# Patient Record
Sex: FEMALE | Race: WHITE | NOT HISPANIC OR LATINO | Employment: FULL TIME | URBAN - METROPOLITAN AREA
[De-identification: names, ages, dates, MRNs, and addresses within clinical notes are randomized per-mention and may not be internally consistent; named-entity substitution may affect disease eponyms.]

---

## 2023-02-20 LAB
ESTRADIOL (PG/ML) IN SER/PLAS: 219 PG/ML
FOLLITROPIN (IU/L) IN SER/PLAS: 14.1 IU/L
LUTEINIZING HORMONE (IU/ML) IN SER/PLAS: 12.6 IU/L

## 2024-02-21 ENCOUNTER — TELEPHONE (OUTPATIENT)
Dept: GYNECOLOGIC ONCOLOGY | Facility: HOSPITAL | Age: 59
End: 2024-02-21
Payer: COMMERCIAL

## 2024-02-21 DIAGNOSIS — Z12.31 SCREENING MAMMOGRAM, ENCOUNTER FOR: Primary | ICD-10-CM

## 2024-02-29 ENCOUNTER — OFFICE VISIT (OUTPATIENT)
Dept: GYNECOLOGIC ONCOLOGY | Facility: CLINIC | Age: 59
End: 2024-02-29
Payer: COMMERCIAL

## 2024-02-29 VITALS
DIASTOLIC BLOOD PRESSURE: 80 MMHG | RESPIRATION RATE: 18 BRPM | WEIGHT: 199.74 LBS | TEMPERATURE: 97.9 F | HEART RATE: 70 BPM | OXYGEN SATURATION: 96 % | SYSTOLIC BLOOD PRESSURE: 126 MMHG

## 2024-02-29 DIAGNOSIS — C53.9 MALIGNANT NEOPLASM OF CERVIX, UNSPECIFIED SITE (MULTI): Primary | ICD-10-CM

## 2024-02-29 PROCEDURE — 88175 CYTOPATH C/V AUTO FLUID REDO: CPT | Mod: TC,GCY | Performed by: NURSE PRACTITIONER

## 2024-02-29 PROCEDURE — 99214 OFFICE O/P EST MOD 30 MIN: CPT | Performed by: NURSE PRACTITIONER

## 2024-02-29 PROCEDURE — 1036F TOBACCO NON-USER: CPT | Performed by: NURSE PRACTITIONER

## 2024-02-29 PROCEDURE — 87624 HPV HI-RISK TYP POOLED RSLT: CPT | Performed by: NURSE PRACTITIONER

## 2024-02-29 PROCEDURE — 88141 CYTOPATH C/V INTERPRET: CPT | Performed by: PATHOLOGY

## 2024-02-29 RX ORDER — LEVOTHYROXINE SODIUM 100 UG/1
100 TABLET ORAL
COMMUNITY

## 2024-02-29 ASSESSMENT — PAIN SCALES - GENERAL: PAINLEVEL: 0-NO PAIN

## 2024-02-29 NOTE — PROGRESS NOTES
Patient ID: Cassy Casanova is a 58 y.o. female.  Referring Physician: No referring provider defined for this encounter.  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  Initial treatment was with with conization of the cervix, and later underwent total  vaginal hysterectomy in 2008.     No evidence of recurrent dysplasia or cancer on subsequent examinations and cytology assessments.     Diagnosed with papillary thyroid cancer in February 2016.  1 regional lymph node involvement with cancer.      1/8/16- left thyroid with isthmusectomy     4/8/16- right completion thyroidectomy with ipsilateral central neck lymph node dissection and right inferior parathyroid autotransplant.     6/8/17 vaginal scrape - cytology negative     8/25/17 anal skin tag - benign squamous papilloma (acrochordon)      8/2019 - mother (Kristy Rob - Dr. 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 Initial treatment with conization of the cervix  and later underwent total vaginal hysterectomy in 2008. Last seen in September 2019. Patient with history of thyroid cancer with involvement to 1 regional lymph node. Patient underwent a partial thyroidectomy in February 2016. Patient with completion of total thyroidectomy in 4/2016. Patient shared she is followed at [facility] and remains cancer free.      Objective    BSA: There is no height or weight on file to calculate BSA.  /80   Pulse 70   Temp 36.6 °C (97.9 °F)   Resp 18   Wt 90.6 kg (199 lb 11.8 oz)   SpO2 96%      Physical Exam  Vitals and nursing note reviewed.   Constitutional:       Appearance: Normal appearance. She is normal weight.   HENT:       Mouth/Throat:      Mouth: Mucous membranes are moist.      Pharynx: Oropharynx is clear.   Eyes:      Conjunctiva/sclera: Conjunctivae normal.      Pupils: Pupils are equal, round, and reactive to light.   Cardiovascular:      Rate and Rhythm: Normal rate and regular rhythm.   Pulmonary:      Effort: Pulmonary effort is normal.      Breath sounds: Normal breath sounds.   Abdominal:      General: Abdomen is flat. There is no distension.      Palpations: Abdomen is soft. There is no mass.      Tenderness: There is no abdominal tenderness.   Genitourinary:     General: Normal vulva.      Vagina: Normal.      Uterus: Absent.       Rectum: Normal.   Musculoskeletal:         General: Normal range of motion.   Skin:     General: Skin is warm and dry.   Neurological:      Mental Status: She is alert.   Psychiatric:         Mood and Affect: Mood normal.         Behavior: Behavior normal.       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 Initial treatment with conization of the cervix  and later underwent total vaginal hysterectomy in 2008.     Plan:    Patient instructed to schedule to return to clinic in 12 months.      Check vaginal scrape with reflex HPV.    Order for mammogram entered electronically

## 2024-03-17 LAB
CYTOLOGY CMNT CVX/VAG CYTO-IMP: NORMAL
HPV HR 12 DNA GENITAL QL NAA+PROBE: NEGATIVE
HPV HR GENOTYPES PNL CVX NAA+PROBE: NEGATIVE
HPV16 DNA SPEC QL NAA+PROBE: NEGATIVE
HPV18 DNA SPEC QL NAA+PROBE: NEGATIVE
LAB AP HISTORY OF MALIGNANCY: NORMAL
LAB AP HPV GENOTYPE QUESTION: YES
LAB AP HPV HR: NORMAL
LABORATORY COMMENT REPORT: NORMAL
PATH REPORT.TOTAL CANCER: NORMAL

## 2024-03-19 ENCOUNTER — TELEPHONE (OUTPATIENT)
Dept: GYNECOLOGIC ONCOLOGY | Facility: HOSPITAL | Age: 59
End: 2024-03-19
Payer: COMMERCIAL

## 2024-03-19 NOTE — TELEPHONE ENCOUNTER
Phoned patient to notify her that pap results are stable, ASCUS/- HPV and Alayna Piper CNP recommends keeping follow up as scheduled March 2025.

## 2024-08-08 ENCOUNTER — APPOINTMENT (OUTPATIENT)
Dept: RADIOLOGY | Facility: CLINIC | Age: 59
End: 2024-08-08
Payer: COMMERCIAL

## 2024-08-19 ENCOUNTER — HOSPITAL ENCOUNTER (OUTPATIENT)
Age: 59
Discharge: HOME | End: 2024-08-19
Payer: COMMERCIAL

## 2024-08-19 LAB
25(OH)D3 SERPL-MCNC: 35 NG/ML (ref 30–100)
ANION GAP SERPL CALC-SCNC: 12 MEQ/L (ref 8–16)
BUN SERPL-MCNC: 18 MG/DL (ref 7–22)
CALCIUM SERPL-MCNC: 9.4 MG/DL (ref 8.5–10.5)
CHLORIDE SERPL-SCNC: 104 MEQ/L (ref 98–111)
CO2 SERPL-SCNC: 25 MEQ/L (ref 23–33)
CREAT SERPL-MCNC: 0.7 MG/DL (ref 0.4–1.2)
DEPRECATED MEAN GLUCOSE BLD GHB EST-ACNC: 102 MG/DL (ref 70–126)
GFR SERPL CREATININE-BSD FRML MDRD: > 90 ML/MIN/1.73M2
GLUCOSE SERPL-MCNC: 89 MG/DL (ref 70–108)
HBA1C MFR BLD HPLC: 5.4 % (ref 4.4–6.4)
POTASSIUM SERPL-SCNC: 4.3 MEQ/L (ref 3.5–5.2)
SODIUM SERPL-SCNC: 141 MEQ/L (ref 135–145)
T4 FREE SERPL-MCNC: 1.5 NG/DL (ref 0.93–1.68)
TSH SERPL DL<=0.005 MIU/L-ACNC: 1.18 UIU/ML (ref 0.4–4.2)
VIT B12 SERPL-MCNC: 612 PG/ML (ref 211–911)

## 2024-08-19 PROCEDURE — 80048 BASIC METABOLIC PNL TOTAL CA: CPT

## 2024-08-19 PROCEDURE — 36415 COLL VENOUS BLD VENIPUNCTURE: CPT

## 2024-08-19 PROCEDURE — 82607 VITAMIN B-12: CPT

## 2024-08-19 PROCEDURE — 84439 ASSAY OF FREE THYROXINE: CPT

## 2024-08-19 PROCEDURE — 83036 HEMOGLOBIN GLYCOSYLATED A1C: CPT

## 2024-08-19 PROCEDURE — 84443 ASSAY THYROID STIM HORMONE: CPT

## 2024-08-19 PROCEDURE — 82306 VITAMIN D 25 HYDROXY: CPT

## 2024-08-26 ENCOUNTER — TELEPHONE (OUTPATIENT)
Dept: GYNECOLOGIC ONCOLOGY | Facility: HOSPITAL | Age: 59
End: 2024-08-26

## 2024-08-26 DIAGNOSIS — R35.0 URINARY FREQUENCY: Primary | ICD-10-CM

## 2024-08-26 NOTE — TELEPHONE ENCOUNTER
Patient called and said that she is having increase frequency with urination and dribbling after voiding for about a month now. She denies fever, odor/cloudiness of urine, pain or burning with urination, or pelvic pain. She is not sure if she has an infection or a weakening of her bladder control. I told her I would update her NP for recommendations.

## 2024-08-27 ENCOUNTER — TELEPHONE (OUTPATIENT)
Dept: GYNECOLOGIC ONCOLOGY | Facility: HOSPITAL | Age: 59
End: 2024-08-27

## 2024-08-27 ENCOUNTER — LAB (OUTPATIENT)
Dept: LAB | Facility: LAB | Age: 59
End: 2024-08-27
Payer: COMMERCIAL

## 2024-08-27 DIAGNOSIS — R35.0 URINARY FREQUENCY: ICD-10-CM

## 2024-08-27 DIAGNOSIS — R35.0 URINARY FREQUENCY: Primary | ICD-10-CM

## 2024-08-27 LAB
APPEARANCE UR: ABNORMAL
BILIRUB UR STRIP.AUTO-MCNC: NEGATIVE MG/DL
CAOX CRY #/AREA UR COMP ASSIST: ABNORMAL /HPF
COLOR UR: YELLOW
GLUCOSE UR STRIP.AUTO-MCNC: NORMAL MG/DL
KETONES UR STRIP.AUTO-MCNC: NEGATIVE MG/DL
LEUKOCYTE ESTERASE UR QL STRIP.AUTO: NEGATIVE
MUCOUS THREADS #/AREA URNS AUTO: ABNORMAL /LPF
NITRITE UR QL STRIP.AUTO: NEGATIVE
PH UR STRIP.AUTO: 5 [PH]
PROT UR STRIP.AUTO-MCNC: NEGATIVE MG/DL
RBC # UR STRIP.AUTO: ABNORMAL /UL
RBC #/AREA URNS AUTO: >20 /HPF
SP GR UR STRIP.AUTO: 1.03
SQUAMOUS #/AREA URNS AUTO: ABNORMAL /HPF
UROBILINOGEN UR STRIP.AUTO-MCNC: NORMAL MG/DL
WBC #/AREA URNS AUTO: ABNORMAL /HPF

## 2024-08-27 PROCEDURE — 81001 URINALYSIS AUTO W/SCOPE: CPT

## 2024-08-27 RX ORDER — CIPROFLOXACIN 500 MG/1
500 TABLET ORAL 2 TIMES DAILY
Qty: 14 TABLET | Refills: 0 | Status: SHIPPED | OUTPATIENT
Start: 2024-08-27 | End: 2024-09-03

## 2024-08-27 NOTE — TELEPHONE ENCOUNTER
Patient called stating she performed home UTI kit yesterday and results showed + WBC and Blood.   Patient continues with urinary frequency, voiding very 15 minutes.     Instructed patient to proceed to  outpatient lab to provide urine specimen for UA/culture as order has been entered.      Message routed to Amisha Piper CNP.    14:34  Phoned patient to notify that Amisha recommends starting ATB, Cipro once patient has provided urine specimen at [facility] lab.    Amisha sent cipro to [facility] in [place].   Patient verbalized her understanding of information given.   Office to follow up urine results.       9/3/24  11:56  Patient called to report that she continues with urinary frequency and intermittent incontinence since starting Cipro on 8/27/24.    UA results and symptom update forwarded to Amisha Piper CNP.       14:10  Phoned patient to notify that Amisha Piper CNP recommends referral to urology.     Messaged Yeni Mcbride requesting she assist in urology appointment scheduling.        9/4/24  10:35  Patient scheduled to see urology NP, Alexus Luna on 9/18/24   Patient aware of appointment time and date per Yeni Mcbride.

## 2024-08-28 LAB — HOLD SPECIMEN: NORMAL

## 2024-08-29 ENCOUNTER — HOSPITAL ENCOUNTER (OUTPATIENT)
Dept: RADIOLOGY | Facility: CLINIC | Age: 59
Discharge: HOME | End: 2024-08-29
Payer: COMMERCIAL

## 2024-08-29 VITALS — HEIGHT: 62 IN | WEIGHT: 194 LBS | BODY MASS INDEX: 35.7 KG/M2

## 2024-08-29 DIAGNOSIS — Z12.31 SCREENING MAMMOGRAM, ENCOUNTER FOR: ICD-10-CM

## 2024-08-29 PROCEDURE — 77067 SCR MAMMO BI INCL CAD: CPT

## 2024-08-29 PROCEDURE — 77063 BREAST TOMOSYNTHESIS BI: CPT | Performed by: RADIOLOGY

## 2024-08-29 PROCEDURE — 77067 SCR MAMMO BI INCL CAD: CPT | Performed by: RADIOLOGY

## 2024-09-17 NOTE — PROGRESS NOTES
[facility]  Outpatient Clinic Note    Patient Name:  Cassy Casanova  MRN:  [id number]  :  1965    Referring Provider: Amisha Piper, *  Date of Service: 2024   Visit type: New patient visit     problem list/Chief complaint:  Urinary frequency      HISTORY OF PRESENT ILLNESS:  Cassy Casanova is a 59 y.o. female with past medical history of asthma, depression, thyroid cancer s/p thyroidectomy, postsurgical hypothyroidism, IA microinvasive adenocarcinoma of the cervix dx  s/p total vaginal hysterectomy in , who presents for initial Urology visit. I performed a detailed review of the medical chart records lab testing and imaging. Patient referred to Urology by her gynecologist for urinary frequency.   Patient reports urinary frequency for the past couple of months. She denies hematuria, no fever or chills. She has no history of kidney of stones, but has family history of kidney stones. She is not a smoker.    Frequency: Q 2-3 hours   Urgency: sometimes  Urge Incontinence: No, has some leaking  Nocturia: 4-5 times per night Sleep Disorder: No    Stream: (mild/moderate/strong) depends, can be mild or strong  Hesitancy: yes, after voiding a couple of time  Straining: No  Intermittency: No  Sensation of Incomplete Emptying: unable to day    Stress Incontinence: only when has full bladder  Pads:  yes, 1 per day     Dysuria:  No  Gross Hematuria: No  UTI:  No  Stones:  No    Consumes: unsure how many oz of fluid per day.     Has used medications for bladder in the past? No    Has had urodynamic testing in the past? No    Bowel Function:   Pt has no constipation.     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    Other conditions influencing health status     No significant past medical history    Personal history of other infectious and parasitic diseases 01/19/2016    History of candidiasis of mouth       PAST SURGICAL HISTORY:  Past Surgical History:   Procedure Laterality Date    HYSTERECTOMY  12/29/2015    Hysterectomy    OTHER SURGICAL HISTORY  01/19/2016    Thyroid Surgery Ramon-Thyroidectomy Left Lobe    OTHER SURGICAL HISTORY  09/22/2017    Anal Papillectomy    TOTAL VAGINAL HYSTERECTOMY  12/27/2015    Vaginal Hysterectomy       ALLERGIES:  Allergies   Allergen Reactions    Penicillins Anaphylaxis    Adhesive Tape-Silicones Itching     Redness, itching, swelling    Aspirin Hives    Azithromycin Hives    Codeine Hives    Erythromycin Hives    Heparin Other    Nitrofurantoin Itching    Sulfa (Sulfonamide Antibiotics) Hives and Itching    Levothyroxine Anxiety       MEDICATIONS:  Current Outpatient Medications   Medication Instructions    levothyroxine (SYNTHROID, LEVOXYL) 100 mcg, oral, Daily before breakfast        SOCIAL HISTORY:  Social History     Tobacco Use    Smoking status: Never    Smokeless tobacco: Never        FAMILY HISTORY:  Family History   Problem Relation Name Age of Onset    Uterine cancer Mother      Nephrolithiasis Mother          REVIEW OF SYSTEMS:  10-pt ROS reviewed and negative except as mentioned above.    Vital signs:  There were no vitals taken for this visit.    PHYSICAL EXAMINATION:  General: Appears comfortable and in no apparent distress.  Head: Normocephalic, atraumatic  Eyes: Non-injected conjunctiva, sclera clear, no proptosis  Lungs: Breathing is easy, non-labored. Speaking in clear and complete sentences. Normal diaphragmatic movement.  Cardiovascular: no peripheral edema, cyanosis or pallor.   Abdomen: soft, non-distended, non-tender  : Bladder: non tender, not distended  MSK: Ambulatory with steady gait, unassisted  Skin: No  visible rashes or lesions  Neurologic: Alert, oriented to person, place, and time  Psychiatric: mood and affect appropriate      LABORATORY DATA:    Office Visit on 09/18/2024   Component Date Value    POC Color, Urine 09/18/2024 Yellow     POC Appearance, Urine 09/18/2024 Clear     POC Glucose, Urine 09/18/2024 NEGATIVE     POC Bilirubin, Urine 09/18/2024 NEGATIVE     POC Ketones, Urine 09/18/2024 NEGATIVE     POC Specific Gravity, Ur* 09/18/2024 >=1.030     POC Blood, Urine 09/18/2024 SMALL (1+) (A)     POC PH, Urine 09/18/2024 6.0     POC Protein, Urine 09/18/2024 TRACE (A)     POC Urobilinogen, Urine 09/18/2024 0.2     Poc Nitrite, Urine 09/18/2024 NEGATIVE     POC Leukocytes, Urine 09/18/2024 NEGATIVE        ASSESSMENT:  Cassy Casanova is a 59 y.o. female with urinary frequency who presents for initial urology visit.     1.We discussed the finding of asymptomatic microscopic hematuria. We reviewed the AUA guideline. We reviewed the definition of microhematuria as >3 RBCs/hpf on a microscopic evaluation of a single, properly collected urine specimen. We discussed the risk stratification of patients with microhematuria into low, intermediate, and high risk categories.      Discussed the potential etiologies for hematuria, including physiologic, anticoagulation, NSAIDS, infection, stones, masses/cysts, inflammation, CKD, nephritis, voiding dysfunction, foreign bodies, malignancies, etc     For low risk patients, we discussed shared decision-making to decide between repeat UA within 6 mo or proceeding with cystoscopy and renal US. For patients who elect for repeat UA, persistent microhematuria should be reclassified as intermediate or high risk.     For intermediate risk patients, we discussed the recommendation to proceed with cystoscopy and renal US vs CT urogram imaging.    For high risk patients, we discussed the recommendation to proceed with cystoscopy and CT urogram imaging.     2.Today we discussed the  "definition of Overactive Bladder (OAB) as a clinical diagnosis that refers to \"urinary urgency, usually accompanied by frequency and nocturia, with or without urge incontinence, in the absence of urinary tract infection or any other obvious pathology.\" We discussed in detail the risk factors for OAB including bladder inflammation, chronic bladder outlet obstruction  urethral stenosis), central nervous systems disorders, and vaginal delivery of a child, postmenopausal status. I had a long discussion with patient regarding the first line treatment for OAB is behavioral therapy with or without medication therapy.     Behavioral therapy include the following elements:   1) Avoid activities that exacerbate incontinence  2) Maintain a voiding diary  3) Timed/scheduled voiding to empty the bladder before incontinence or severe urgency occurs  4) Bladder training  5) Kegel exercises and pelvic floor muscle training  6) Fluid intake management-avoid excessive fluid intake  7) Dietary management-avoid bladder irritants (caffeine, alcohol, spicy food, acidic food)  8) Avoid constipation  9) Stop smoking!    Patient was informed that second line treatment includes medications. We discussed Mirabegron and that the side effects include possible increase in blood pressure in a small minority of patients, however insurance does not always cover this. We also discussed anticholinergic medications which can have the side effects of dry eyes, dry mouth, constipation and rarely cognitive changes.    PLAN:  - UA with small blood (+1), will send for culture and sensitivity to rule out infection. Will call patient with results.  - If urine culture is negative, will consider medications for OAB  - Microscopy on 8/27/24 with RBC >20!  - PVR 0 ml  - Will recheck UA in 4 weeks to monitor microscopic hematuria  - Patient will implement dietary and behavioral modifications including Kegels for OAB  - Referral for Pelvic Floor Physical Therapy " placed. List of locations provided, patient to call and schedule appt.    All questions and concerns were addressed. Patient verbalizes understanding and has no other questions at this time.     E&M visit today is associated with current or anticipated ongoing medical care services related to a patient's single, serious condition or a complex condition.    JAMES Mitchell-CNP  [facility] [place]  9/18/2024

## 2024-09-18 ENCOUNTER — OFFICE VISIT (OUTPATIENT)
Dept: UROLOGY | Facility: HOSPITAL | Age: 59
End: 2024-09-18
Payer: COMMERCIAL

## 2024-09-18 DIAGNOSIS — R35.0 URINARY FREQUENCY: Primary | ICD-10-CM

## 2024-09-18 DIAGNOSIS — R31.29 MICROSCOPIC HEMATURIA: ICD-10-CM

## 2024-09-18 PROBLEM — J45.909 ASTHMA: Status: ACTIVE | Noted: 2024-09-18

## 2024-09-18 PROBLEM — E04.2 MULTIPLE THYROID NODULES: Status: ACTIVE | Noted: 2024-09-18

## 2024-09-18 LAB
POC APPEARANCE, URINE: CLEAR
POC BILIRUBIN, URINE: NEGATIVE
POC BLOOD, URINE: ABNORMAL
POC COLOR, URINE: YELLOW
POC GLUCOSE, URINE: NEGATIVE MG/DL
POC KETONES, URINE: NEGATIVE MG/DL
POC LEUKOCYTES, URINE: NEGATIVE
POC NITRITE,URINE: NEGATIVE
POC PH, URINE: 6 PH
POC PROTEIN, URINE: ABNORMAL MG/DL
POC SPECIFIC GRAVITY, URINE: >=1.03
POC UROBILINOGEN, URINE: 0.2 EU/DL

## 2024-09-18 PROCEDURE — G2211 COMPLEX E/M VISIT ADD ON: HCPCS | Performed by: NURSE PRACTITIONER

## 2024-09-18 PROCEDURE — 87086 URINE CULTURE/COLONY COUNT: CPT | Performed by: NURSE PRACTITIONER

## 2024-09-18 PROCEDURE — 99214 OFFICE O/P EST MOD 30 MIN: CPT | Performed by: NURSE PRACTITIONER

## 2024-09-18 PROCEDURE — 99204 OFFICE O/P NEW MOD 45 MIN: CPT | Performed by: NURSE PRACTITIONER

## 2024-09-18 PROCEDURE — 81003 URINALYSIS AUTO W/O SCOPE: CPT | Mod: QW | Performed by: NURSE PRACTITIONER

## 2024-09-18 PROCEDURE — 51798 US URINE CAPACITY MEASURE: CPT | Performed by: NURSE PRACTITIONER

## 2024-09-18 PROCEDURE — 1036F TOBACCO NON-USER: CPT | Performed by: NURSE PRACTITIONER

## 2024-09-18 ASSESSMENT — PAIN SCALES - GENERAL: PAINLEVEL: 0-NO PAIN

## 2024-09-21 LAB — BACTERIA UR CULT: ABNORMAL

## 2024-09-23 ENCOUNTER — TELEPHONE (OUTPATIENT)
Dept: UROLOGY | Facility: HOSPITAL | Age: 59
End: 2024-09-23
Payer: COMMERCIAL

## 2024-09-23 DIAGNOSIS — N39.0 LOWER URINARY TRACT INFECTIOUS DISEASE: Primary | ICD-10-CM

## 2024-09-23 DIAGNOSIS — R31.29 MICROSCOPIC HEMATURIA: ICD-10-CM

## 2024-09-23 LAB — BACTERIA UR CULT: ABNORMAL

## 2024-09-23 RX ORDER — LEVOFLOXACIN 500 MG/1
500 TABLET, FILM COATED ORAL DAILY
Qty: 7 TABLET | Refills: 0 | Status: SHIPPED | OUTPATIENT
Start: 2024-09-23 | End: 2024-09-30

## 2024-09-23 RX ORDER — LEVOFLOXACIN 250 MG/1
250 TABLET ORAL DAILY
Qty: 7 TABLET | Refills: 0 | Status: SHIPPED | OUTPATIENT
Start: 2024-09-23 | End: 2024-09-30

## 2024-09-23 NOTE — TELEPHONE ENCOUNTER
Called and spoke to patient regarding positive urine culture results. Prescribed Levofloxacin 750 mg daily x 7 days for UTI. Patient prefers not to cut pill in half. Prescribed Levofloxacin 500 mg and 250 mg for patient to take together. Patient will contact our office with any questions or concerns.    JAMES Mitchell-CNP  [facility] [place]  9/23/2024 11:33 AM

## 2024-09-25 ENCOUNTER — OFFICE VISIT (OUTPATIENT)
Dept: FAMILY MEDICINE CLINIC | Age: 59
End: 2024-09-25
Payer: COMMERCIAL

## 2024-09-25 VITALS
HEIGHT: 62 IN | SYSTOLIC BLOOD PRESSURE: 138 MMHG | WEIGHT: 194 LBS | DIASTOLIC BLOOD PRESSURE: 80 MMHG | OXYGEN SATURATION: 99 % | RESPIRATION RATE: 16 BRPM | BODY MASS INDEX: 35.7 KG/M2 | HEART RATE: 86 BPM

## 2024-09-25 DIAGNOSIS — F41.9 ANXIETY: ICD-10-CM

## 2024-09-25 DIAGNOSIS — I99.9 VASCULAR LESION OF SKIN: ICD-10-CM

## 2024-09-25 DIAGNOSIS — E89.0 POSTOPERATIVE HYPOTHYROIDISM: ICD-10-CM

## 2024-09-25 DIAGNOSIS — M54.50 LUMBAR SPINE PAIN: ICD-10-CM

## 2024-09-25 DIAGNOSIS — M54.12 CERVICAL RADICULOPATHY: ICD-10-CM

## 2024-09-25 DIAGNOSIS — J45.20 MILD INTERMITTENT ASTHMA WITHOUT COMPLICATION: ICD-10-CM

## 2024-09-25 DIAGNOSIS — M50.90 CERVICAL DISC DISEASE: ICD-10-CM

## 2024-09-25 DIAGNOSIS — Z71.3 DIETARY COUNSELING: ICD-10-CM

## 2024-09-25 DIAGNOSIS — Z00.00 ENCOUNTER FOR WELL ADULT EXAM WITHOUT ABNORMAL FINDINGS: Primary | ICD-10-CM

## 2024-09-25 PROCEDURE — 99396 PREV VISIT EST AGE 40-64: CPT | Performed by: FAMILY MEDICINE

## 2024-09-25 SDOH — ECONOMIC STABILITY: FOOD INSECURITY: WITHIN THE PAST 12 MONTHS, YOU WORRIED THAT YOUR FOOD WOULD RUN OUT BEFORE YOU GOT MONEY TO BUY MORE.: NEVER TRUE

## 2024-09-25 SDOH — ECONOMIC STABILITY: INCOME INSECURITY: HOW HARD IS IT FOR YOU TO PAY FOR THE VERY BASICS LIKE FOOD, HOUSING, MEDICAL CARE, AND HEATING?: NOT HARD AT ALL

## 2024-09-25 SDOH — ECONOMIC STABILITY: FOOD INSECURITY: WITHIN THE PAST 12 MONTHS, THE FOOD YOU BOUGHT JUST DIDN'T LAST AND YOU DIDN'T HAVE MONEY TO GET MORE.: NEVER TRUE

## 2024-09-25 ASSESSMENT — ENCOUNTER SYMPTOMS
NAUSEA: 0
SHORTNESS OF BREATH: 0
WHEEZING: 0
COUGH: 0
SINUS PRESSURE: 0
VOMITING: 0

## 2024-09-25 ASSESSMENT — PATIENT HEALTH QUESTIONNAIRE - PHQ9
SUM OF ALL RESPONSES TO PHQ QUESTIONS 1-9: 0
2. FEELING DOWN, DEPRESSED OR HOPELESS: NOT AT ALL
SUM OF ALL RESPONSES TO PHQ QUESTIONS 1-9: 0
1. LITTLE INTEREST OR PLEASURE IN DOING THINGS: NOT AT ALL
SUM OF ALL RESPONSES TO PHQ QUESTIONS 1-9: 0
SUM OF ALL RESPONSES TO PHQ QUESTIONS 1-9: 0
SUM OF ALL RESPONSES TO PHQ9 QUESTIONS 1 & 2: 0

## 2024-10-01 ENCOUNTER — TELEPHONE (OUTPATIENT)
Dept: FAMILY MEDICINE CLINIC | Age: 59
End: 2024-10-01

## 2024-10-01 NOTE — TELEPHONE ENCOUNTER
----- Message from Dr. Shanon Yoo MD sent at 9/29/2024  6:39 AM EDT -----  please obtain colonoscopy from [facility] (requested 5/2023 but can't see it was followed up).  If can't be found, then ask clarification from patient. Thank you.

## 2024-10-07 DIAGNOSIS — N39.0 LOWER URINARY TRACT INFECTIOUS DISEASE: Primary | ICD-10-CM

## 2024-10-08 ENCOUNTER — HOSPITAL ENCOUNTER (OUTPATIENT)
Age: 59
Discharge: HOME | End: 2024-10-08
Payer: COMMERCIAL

## 2024-10-08 ENCOUNTER — TELEPHONE (OUTPATIENT)
Facility: CLINIC | Age: 59
End: 2024-10-08
Payer: COMMERCIAL

## 2024-10-08 LAB
BACTERIA URNS QL MICRO: ABNORMAL /HPF
BILIRUB UR QL STRIP.AUTO: NEGATIVE
CASTS #/AREA URNS LPF: ABNORMAL /LPF
CASTS 2: ABNORMAL /LPF
CHARACTER UR: CLEAR
COLOR, UA: YELLOW
CRYSTALS URNS MICRO: ABNORMAL
EPITHELIAL CELLS, UA: ABNORMAL /HPF
GLUCOSE UR QL STRIP.AUTO: NEGATIVE MG/DL
HGB UR QL STRIP.AUTO: ABNORMAL
KETONES UR QL STRIP.AUTO: NEGATIVE
MISCELLANEOUS 2: ABNORMAL
NITRITE UR QL STRIP: NEGATIVE
PH UR STRIP.AUTO: 5.5 [PH] (ref 5–9)
PROT UR STRIP.AUTO-MCNC: NEGATIVE MG/DL
RBC URINE: ABNORMAL /HPF
RENAL EPI CELLS #/AREA URNS HPF: ABNORMAL /[HPF]
SP GR UR REFRACT.AUTO: 1.02 (ref 1–1.03)
UROBILINOGEN, URINE: 0.2 EU/DL (ref 0–1)
WBC #/AREA URNS HPF: ABNORMAL /HPF
WBC #/AREA URNS HPF: NEGATIVE /[HPF]
YEAST LIKE FUNGI URNS QL MICRO: ABNORMAL

## 2024-10-08 PROCEDURE — 81001 URINALYSIS AUTO W/SCOPE: CPT

## 2024-10-08 NOTE — TELEPHONE ENCOUNTER
Pt called main number requsting urinalysis order be faxed to [phone].  Request completed and confirmation received

## 2024-10-11 ENCOUNTER — TELEPHONE (OUTPATIENT)
Dept: FAMILY MEDICINE CLINIC | Age: 59
End: 2024-10-11

## 2024-10-11 DIAGNOSIS — R31.29 MICROSCOPIC HEMATURIA: Primary | ICD-10-CM

## 2024-10-11 DIAGNOSIS — R31.29 MICROSCOPIC HEMATURIA: ICD-10-CM

## 2024-10-11 DIAGNOSIS — R30.0 DYSURIA: Primary | ICD-10-CM

## 2024-10-11 DIAGNOSIS — R32 URINARY INCONTINENCE, UNSPECIFIED TYPE: ICD-10-CM

## 2024-10-11 DIAGNOSIS — R35.0 URINARY FREQUENCY: Primary | ICD-10-CM

## 2024-10-11 DIAGNOSIS — R35.0 URINARY FREQUENCY: ICD-10-CM

## 2024-10-11 DIAGNOSIS — N30.90 CYSTITIS: ICD-10-CM

## 2024-10-11 RX ORDER — SOLIFENACIN SUCCINATE 5 MG/1
5 TABLET, FILM COATED ORAL DAILY
Qty: 30 TABLET | Refills: 0 | Status: SHIPPED | OUTPATIENT
Start: 2024-10-11

## 2024-10-11 NOTE — TELEPHONE ENCOUNTER
Patient was following with [facility] urology  Requesting a new referral to someone local  She was advised to check with insurance to see who is in-network  She would like to know who you prefer first then she will see if they are in her network    Also re-faxed PT order as requested

## 2024-10-11 NOTE — PROGRESS NOTES
Patient called with complaints of urinary frequency. UA from outside facility reviewed with patient which shows trace blood, no leukocytes or nitrites. Discussed work up for recurrent microscopic hematuria. Order for CT urogram and Cystoscopy placed. Prescription for Vesicare 5 mg daily for urinary frequency sent to pharmacy. Common SE discussed with patient.  Risks of cystoscopy were discussed with the patient in great detail, including the risk of hematuria, UTI and discomfort. Patient understands and desires to proceed.      JAMES Mitchell-CNP  [facility] [place]  10/11/2024 11:12 AM

## 2024-10-14 ENCOUNTER — TRANSCRIBE ORDERS (OUTPATIENT)
Dept: ADMINISTRATIVE | Age: 59
End: 2024-10-14

## 2024-10-14 DIAGNOSIS — R31.29 MICROSCOPIC HEMATURIA: ICD-10-CM

## 2024-10-14 DIAGNOSIS — R30.0 DYSURIA: Primary | ICD-10-CM

## 2024-10-14 NOTE — TELEPHONE ENCOUNTER
Patient notified referral was made to Dr Reese- she will check with her insurance to see if in network

## 2024-10-18 ENCOUNTER — HOSPITAL ENCOUNTER (OUTPATIENT)
Dept: CT IMAGING | Age: 59
Discharge: HOME | End: 2024-10-18
Payer: COMMERCIAL

## 2024-10-18 DIAGNOSIS — R30.0 DYSURIA: ICD-10-CM

## 2024-10-18 DIAGNOSIS — R31.29 MICROSCOPIC HEMATURIA: ICD-10-CM

## 2024-10-18 PROCEDURE — 6360000004 HC RX CONTRAST MEDICATION: Performed by: NURSE PRACTITIONER

## 2024-10-18 PROCEDURE — 74178 CT ABD&PLV WO CNTR FLWD CNTR: CPT | Performed by: NURSE PRACTITIONER

## 2024-10-18 RX ORDER — IOPAMIDOL 755 MG/ML
80 INJECTION, SOLUTION INTRAVASCULAR
Status: COMPLETED | OUTPATIENT
Start: 2024-10-18 | End: 2024-10-18

## 2024-10-18 RX ORDER — IOPAMIDOL 755 MG/ML
INJECTION, SOLUTION INTRAVASCULAR
Status: CANCELLED | OUTPATIENT
Start: 2024-10-18

## 2024-10-18 RX ADMIN — IOPAMIDOL 80 ML: 755 INJECTION, SOLUTION INTRAVENOUS at 09:43

## 2024-10-21 ENCOUNTER — APPOINTMENT (OUTPATIENT)
Dept: UROLOGY | Facility: HOSPITAL | Age: 59
End: 2024-10-21
Payer: COMMERCIAL

## 2024-10-23 ENCOUNTER — APPOINTMENT (OUTPATIENT)
Dept: RADIOLOGY | Facility: HOSPITAL | Age: 59
End: 2024-10-23
Payer: COMMERCIAL

## 2024-10-24 ENCOUNTER — OFFICE VISIT (OUTPATIENT)
Dept: UROLOGY | Age: 59
End: 2024-10-24
Payer: COMMERCIAL

## 2024-10-24 VITALS — HEIGHT: 62 IN | RESPIRATION RATE: 18 BRPM | BODY MASS INDEX: 36.07 KG/M2 | WEIGHT: 196 LBS

## 2024-10-24 DIAGNOSIS — N32.81 OAB (OVERACTIVE BLADDER): ICD-10-CM

## 2024-10-24 DIAGNOSIS — R31.0 GROSS HEMATURIA: Primary | ICD-10-CM

## 2024-10-24 PROCEDURE — 99204 OFFICE O/P NEW MOD 45 MIN: CPT | Performed by: UROLOGY

## 2024-10-24 PROCEDURE — 81003 URINALYSIS AUTO W/O SCOPE: CPT | Performed by: UROLOGY

## 2024-10-24 RX ORDER — MULTIVITAMIN WITH IRON
1 TABLET ORAL DAILY
COMMUNITY

## 2024-10-24 NOTE — PROGRESS NOTES
Dylan Andrade MD.    [facility] [place] [facility]  [facility] UROLOGY  [address]  [address]  [facility] [address]  Dept: [phone]  Dept Fax: [phone]  Loc: [phone]    [facility] Urology Office Note -     Patient:  Janene Corral  YOB: 1965    The patient is a 59 y.o. female who presents today for evaluation of the following problems:   Chief Complaint   Patient presents with    New Patient     Frequent Cystitis     Month ago she had frequency and urgency, with blood in urine has been on may antibiotics. She was drinking a boost protine drink, she quit drinking that and that has helped    Pt had a ct scan, she is with another provider in [place] too have a cyst next week canceled because she needs to be closer to home.    Results     CT scans are done     referred/consultation requested by Shanon Yoo MD.    History of Present Illness:    Freq/urgency  No pain, dysuria  Symptoms: freq/urgency severe. UA negative.   Improved after decreasing protein shakes++++    Gross hematuria  Nonsmoker  Ct negative      Recently moved from [place]      Requested/reviewed records from Shanon Yoo MD office and/or outside physician/EMR    (Patient's old records have been requested, reviewed and pertinent findings summarized in today's note.)    Procedures Today: N/A      Last several PSA's:  No results found for: \"PSA\"    Last total testosterone:  No results found for: \"TESTOSTERONE\"    Urinalysis today:  No results found for this visit on 10/24/24.    Last BUN and creatinine:  Lab Results   Component Value Date    BUN 18 08/19/2024     Lab Results   Component Value Date    CREATININE 0.7 08/19/2024         Imaging Reviewed during this Office Visit:   DYLAN ANDRADE MD independently reviewed the images and verified the radiology reports from:    CT UROGRAM    Result Date: 10/18/2024  PROCEDURE: CT UROGRAM CLINICAL INFORMATION: Dysuria; Other microscopic

## 2024-10-25 ENCOUNTER — TELEPHONE (OUTPATIENT)
Dept: UROLOGY | Age: 59
End: 2024-10-25

## 2024-10-25 NOTE — TELEPHONE ENCOUNTER
CPT 93720- No prior authorization required per [email or web address].    Reference#: [id number]

## 2024-10-28 ENCOUNTER — APPOINTMENT (OUTPATIENT)
Dept: UROLOGY | Facility: CLINIC | Age: 59
End: 2024-10-28
Payer: COMMERCIAL

## 2024-10-31 ENCOUNTER — HOSPITAL ENCOUNTER (OUTPATIENT)
Dept: UROLOGY | Age: 59
Discharge: HOME | End: 2024-10-31
Payer: COMMERCIAL

## 2024-10-31 VITALS
HEIGHT: 62 IN | OXYGEN SATURATION: 100 % | DIASTOLIC BLOOD PRESSURE: 80 MMHG | TEMPERATURE: 97.4 F | RESPIRATION RATE: 16 BRPM | WEIGHT: 196.1 LBS | HEART RATE: 69 BPM | SYSTOLIC BLOOD PRESSURE: 147 MMHG | BODY MASS INDEX: 36.09 KG/M2

## 2024-10-31 LAB
BILIRUBIN, URINE: NEGATIVE
BLOOD URINE, POC: ABNORMAL
CHARACTER, URINE: ABNORMAL
COLOR, UA: YELLOW
GLUCOSE URINE: NEGATIVE MG/DL
KETONES, URINE: NEGATIVE
LEUKOCYTE CLUMPS, URINE: ABNORMAL
NITRITE, URINE: NEGATIVE
PH, URINE: 5.5 (ref 5–9)
PROTEIN, URINE: NEGATIVE MG/DL
SPECIFIC GRAVITY UA: 1.02 (ref 1–1.03)
UROBILINOGEN, URINE: 0.2 EU/DL (ref 0–1)

## 2024-10-31 PROCEDURE — 52000 CYSTOURETHROSCOPY: CPT

## 2024-10-31 NOTE — PROGRESS NOTES
[facility]  This procedure has been fully reviewed with the patient and written informed consent has been obtained.  1001 Procedure started with Dr. Palacio  1002 Procedure completed; patient tolerated well.  Dr. Palacio talked to patient in length about procedure findings.  Patient discharged.    PLAN     Follow up in office in 1 year with YESSENIA with UA.  Office will call to schedule.   -Has only seen Hakeem in office.

## 2024-10-31 NOTE — OP NOTE
Cystoscopy    Operative Note    Patient:  Janene Corral  MRN: [id number]  YOB: 1965    Date: 10/31/24  Surgeon: BAILEY ANDRADE MD  Anesthesia: Urojet Local  Indications:     Gross hematuria- needs cystoscopy for completion of hematuria workup. Nonsmoker.     Oab- these symptoms are slowly improving on their own. Avoid bladder irritants. Rule out concerning pathology. If this is done, consider oab protocol.          Position: Dorsal Lithotomy  EBL: 0 ml    Findings:   The patient was prepped and draped in the usual sterile fashion.  The cystoscope was advanced through the urethra and into the bladder.  The bladder was thoroughly inspected and the following was noted:    Vagina: normal appearing vagina with normal color and discharge, no lesions  Residual Urine: mild.  Urine clear, with no obvious infection  Urethra: normal appearing urethra with no masses, tenderness or lesions    Bladder: no tumor noted .  no bladder diverticulum.  Moderate trabeculation noted.  Ureters: Orifices with normal configuration and location.      The cystoscope was removed.  The patient tolerated the procedure well.  If oab worsens she can start meds she will call  Ua check in one year  Hematuria workup negative    fAPP in one year

## 2024-10-31 NOTE — DISCHARGE INSTRUCTIONS
Discharge instructions: Cystoscopy  You May experience painful urination and see blood in the urine after your procedure.  This should resolve over time.      Pt ok to discharge home in good condition  No heavy lifting, >10 lbs for today  Pt should avoid strenuous activity for today  Pt should walk moderately at home  Pt ok to shower   Pt may resume diet as tolerated  Please call attending physician or hospital  with questions  Call or Present to ED if fever (> 101F), intractable nausea vomiting or pain.    Follow up in office in 1 year with UA.  Office will call to schedule.

## 2024-11-21 ENCOUNTER — HOSPITAL ENCOUNTER (OUTPATIENT)
Age: 59
Discharge: HOME | End: 2024-11-21
Payer: COMMERCIAL

## 2024-11-21 ENCOUNTER — HOSPITAL ENCOUNTER (OUTPATIENT)
Dept: GENERAL RADIOLOGY | Age: 59
Discharge: HOME | End: 2024-11-21
Payer: COMMERCIAL

## 2024-11-21 DIAGNOSIS — M25.551 BILATERAL HIP PAIN: ICD-10-CM

## 2024-11-21 DIAGNOSIS — M25.552 BILATERAL HIP PAIN: ICD-10-CM

## 2024-11-21 PROCEDURE — 73522 X-RAY EXAM HIPS BI 3-4 VIEWS: CPT

## 2025-02-27 ENCOUNTER — TELEPHONE (OUTPATIENT)
Dept: FAMILY MEDICINE CLINIC | Age: 60
End: 2025-02-27

## 2025-02-27 DIAGNOSIS — Z20.828 EXPOSURE TO INFLUENZA: Primary | ICD-10-CM

## 2025-02-27 RX ORDER — OSELTAMIVIR PHOSPHATE 75 MG/1
75 CAPSULE ORAL DAILY
Qty: 10 CAPSULE | Refills: 0 | Status: SHIPPED | OUTPATIENT
Start: 2025-02-27 | End: 2025-03-09

## 2025-02-27 NOTE — TELEPHONE ENCOUNTER
Patient called stating her  was seen today and tested positive for influenza.     She is to leave for the weekend and is wanting to know she is able to get tamiflu also as a preventative as she will be staying with her 86 year old father and does not want to get him sick.     [facility]

## 2025-02-28 ENCOUNTER — APPOINTMENT (OUTPATIENT)
Dept: GENERAL RADIOLOGY | Age: 60
End: 2025-02-28
Payer: COMMERCIAL

## 2025-02-28 ENCOUNTER — HOSPITAL ENCOUNTER (EMERGENCY)
Age: 60
Discharge: HOME | End: 2025-02-28
Payer: COMMERCIAL

## 2025-02-28 VITALS
TEMPERATURE: 98.6 F | BODY MASS INDEX: 36.21 KG/M2 | RESPIRATION RATE: 18 BRPM | HEART RATE: 99 BPM | DIASTOLIC BLOOD PRESSURE: 78 MMHG | SYSTOLIC BLOOD PRESSURE: 126 MMHG | OXYGEN SATURATION: 96 % | WEIGHT: 198 LBS

## 2025-02-28 DIAGNOSIS — J11.1 INFLUENZA: Primary | ICD-10-CM

## 2025-02-28 PROCEDURE — 71046 X-RAY EXAM CHEST 2 VIEWS: CPT

## 2025-02-28 PROCEDURE — 99213 OFFICE O/P EST LOW 20 MIN: CPT

## 2025-02-28 ASSESSMENT — PAIN DESCRIPTION - DESCRIPTORS: DESCRIPTORS: HEAVINESS

## 2025-02-28 ASSESSMENT — PAIN SCALES - GENERAL: PAINLEVEL_OUTOF10: 8

## 2025-02-28 ASSESSMENT — PAIN DESCRIPTION - PAIN TYPE: TYPE: ACUTE PAIN

## 2025-02-28 ASSESSMENT — PAIN DESCRIPTION - LOCATION: LOCATION: CHEST

## 2025-02-28 ASSESSMENT — PAIN - FUNCTIONAL ASSESSMENT: PAIN_FUNCTIONAL_ASSESSMENT: 0-10

## 2025-02-28 NOTE — DISCHARGE INSTRUCTIONS
Your x-ray was negative.  There is no consolidation or pneumonia.  Your flu test was positive at a different location.  Please take Tamiflu as previously prescribed until gone.      Please take Tylenol/Motrin in alternating fashion for body aches pain or fevers.    Please hydrate well keeping urine clear/pale yellow.  This is the best way to expedite resolution of viral symptoms.    Please practice good hand hygiene and minimize contact with others.    You are okay to turn to work/school as long as you are fever free without the use of medication and symptoms are overall improving.    Rarely can have secondary infections following viral illnesses including but not limited to possible pneumonia/ear infections.  If you are having prolonged symptoms or symptoms acutely worsen abruptly please return for evaluation.  If things are out-of-control including not limited to chest pain/shortness of breath, uncontrolled nausea/vomiting or uncontrolled fevers please go to ER/call 911.

## 2025-02-28 NOTE — ED PROVIDER NOTES
never smoked. She has never used smokeless tobacco. She reports that she does not currently use alcohol. She reports that she does not use drugs.  PHYSICAL EXAM     ED TRIAGE VITALS  BP: 126/78, Temp: 98.6 °F (37 °C), Pulse: 99, Respirations: 18, SpO2: 96 %,Estimated body mass index is 36.21 kg/m² as calculated from the following:    Height as of 10/31/24: 1.575 m (5' 2\").    Weight as of this encounter: 89.8 kg (198 lb).,Patient's last menstrual period was 07/01/2007.  Physical Exam  Vitals and nursing note reviewed.     Constitutional:       General: No acute distress.     Appearance: Normal appearance. Not ill-appearing, toxic-appearing or diaphoretic.   HENT:      Right Ear: Tympanic membrane, ear canal and external ear normal. There is no impacted cerumen.      Left Ear: Tympanic membrane, ear canal and external ear normal. There is no impacted cerumen.      Nose: No congestion or rhinorrhea.      Mouth/Throat:      Mouth: Mucous membranes are moist.      Pharynx: No oropharyngeal exudate or posterior oropharyngeal erythema.   Eyes:      General:         Right eye: No discharge.         Left eye: No discharge.      Pupils: Pupils are equal, round, and reactive to light.   Neck:      Vascular: No carotid bruit.   Cardiovascular:      Rate and Rhythm: Normal rate and regular rhythm.      Pulses: Normal pulses.      Heart sounds: Normal heart sounds. No murmur heard.  Pulmonary: Congested cough with deep breathing for auscultation     Effort: Pulmonary effort is normal. No respiratory distress.      Breath sounds: Normal breath sounds. No stridor. No wheezing, rhonchi or rales.   Chest:      Chest wall: No tenderness.   Abdominal:      General: Abdomen is flat.      Palpations: Abdomen is soft.      Tenderness: There is no abdominal tenderness.   Musculoskeletal:         General: No swelling or tenderness. Normal range of motion.      Cervical back: Normal range of motion and neck supple. No rigidity or  tenderness.   Lymphadenopathy:      Cervical: No cervical adenopathy.   Skin:     General: Skin is warm and dry.      Capillary Refill: Capillary refill takes less than 2 seconds.      Coloration: Skin is not jaundiced or pale.   Neurological:      General: No focal deficit present.      Mental Status: alert and oriented for age/situation - at baseline     Motor: No weakness.   Psychiatric:         Mood and Affect: Mood normal.         Behavior: Behavior normal.   Genitourinary:  Deferred    DIAGNOSTIC RESULTS   Labs:No results found for this visit on 02/28/25.  IMAGING:  XR CHEST (2 VW)   Final Result      No acute intrathoracic process.               **This report has been created using voice recognition software. It may contain   minor errors which are inherent in voice recognition technology.**         Electronically signed by Dr. Shree Barclay        EKG:  URGENT CARE COURSE:     Vitals:    02/28/25 1244   BP: 126/78   Pulse: 99   Resp: 18   Temp: 98.6 °F (37 °C)   TempSrc: Oral   SpO2: 96%   Weight: 89.8 kg (198 lb)     Medications - No data to display  PROCEDURES: (None if blank)  Procedures:   FINAL IMPRESSION      1. Influenza      URGENT CARE TIMELINE: DISPOSITION/ PLAN AND DECISION MAKING     ED Course as of 02/28/25 1330   Fri Feb 28, 2025   1249 Temp: 98.6 °F (37 °C)  No hypoxia.  Vitals are stable. [JR]   1253 Extensive discussion with patient regarding all of her symptoms can be attributed to influenza.  I explained it was highly likely to be influenza for the first few days at least.  She again request chest x-ray.  Shared decision making opted undergo x-ray to evaluate for any pneumonia. [JR]   1318 XR CHEST (2 VW)  No pneumonia. [JR]      ED Course User Index  [JR] Jorge Marroquin, APRN - CNP     PATIENT REFERRED TO:  Shanon Yoo MD  [address] / [place] [address]  DISCHARGE MEDICATIONS:  New Prescriptions    No medications on file     Discontinued Medications    No

## 2025-03-03 ENCOUNTER — OFFICE VISIT (OUTPATIENT)
Dept: FAMILY MEDICINE CLINIC | Age: 60
End: 2025-03-03
Payer: COMMERCIAL

## 2025-03-03 VITALS
RESPIRATION RATE: 16 BRPM | OXYGEN SATURATION: 98 % | SYSTOLIC BLOOD PRESSURE: 124 MMHG | HEART RATE: 68 BPM | BODY MASS INDEX: 36.4 KG/M2 | WEIGHT: 199 LBS | DIASTOLIC BLOOD PRESSURE: 82 MMHG

## 2025-03-03 DIAGNOSIS — J45.20 MILD INTERMITTENT ASTHMA WITHOUT COMPLICATION: ICD-10-CM

## 2025-03-03 DIAGNOSIS — J11.1 INFLUENZA: Primary | ICD-10-CM

## 2025-03-03 PROCEDURE — G8427 DOCREV CUR MEDS BY ELIG CLIN: HCPCS | Performed by: NURSE PRACTITIONER

## 2025-03-03 PROCEDURE — 99213 OFFICE O/P EST LOW 20 MIN: CPT | Performed by: NURSE PRACTITIONER

## 2025-03-03 PROCEDURE — 1036F TOBACCO NON-USER: CPT | Performed by: NURSE PRACTITIONER

## 2025-03-03 PROCEDURE — 3017F COLORECTAL CA SCREEN DOC REV: CPT | Performed by: NURSE PRACTITIONER

## 2025-03-03 PROCEDURE — G8417 CALC BMI ABV UP PARAM F/U: HCPCS | Performed by: NURSE PRACTITIONER

## 2025-03-03 RX ORDER — ALBUTEROL SULFATE 0.83 MG/ML
2.5 SOLUTION RESPIRATORY (INHALATION) EVERY 6 HOURS PRN
Qty: 120 EACH | Refills: 0 | Status: SHIPPED | OUTPATIENT
Start: 2025-03-03

## 2025-03-03 SDOH — ECONOMIC STABILITY: FOOD INSECURITY: WITHIN THE PAST 12 MONTHS, YOU WORRIED THAT YOUR FOOD WOULD RUN OUT BEFORE YOU GOT MONEY TO BUY MORE.: NEVER TRUE

## 2025-03-03 SDOH — ECONOMIC STABILITY: FOOD INSECURITY: WITHIN THE PAST 12 MONTHS, THE FOOD YOU BOUGHT JUST DIDN'T LAST AND YOU DIDN'T HAVE MONEY TO GET MORE.: NEVER TRUE

## 2025-03-03 ASSESSMENT — ENCOUNTER SYMPTOMS
COUGH: 1
DIARRHEA: 1
SHORTNESS OF BREATH: 1
NAUSEA: 1

## 2025-03-03 ASSESSMENT — PATIENT HEALTH QUESTIONNAIRE - PHQ9
SUM OF ALL RESPONSES TO PHQ QUESTIONS 1-9: 0
1. LITTLE INTEREST OR PLEASURE IN DOING THINGS: NOT AT ALL
SUM OF ALL RESPONSES TO PHQ QUESTIONS 1-9: 0
2. FEELING DOWN, DEPRESSED OR HOPELESS: NOT AT ALL

## 2025-03-03 NOTE — PROGRESS NOTES
maxillary sinus tenderness or frontal sinus tenderness.      Left Sinus: No maxillary sinus tenderness or frontal sinus tenderness.   Eyes:      General: Lids are normal.         Right eye: No discharge.         Left eye: No discharge.      Conjunctiva/sclera: Conjunctivae normal.   Neck:      Trachea: No tracheal deviation.   Cardiovascular:      Rate and Rhythm: Normal rate and regular rhythm.      Heart sounds: Normal heart sounds. No murmur heard.  Pulmonary:      Effort: Pulmonary effort is normal. No tachypnea or respiratory distress.      Breath sounds: No stridor. No wheezing.      Comments: Cough  Musculoskeletal:      Cervical back: Full passive range of motion without pain.   Skin:     General: Skin is dry.      Coloration: Skin is not jaundiced or pale.   Neurological:      General: No focal deficit present.      Mental Status: She is alert. Mental status is at baseline.   Psychiatric:         Mood and Affect: Mood and affect normal.         Behavior: Behavior is cooperative.                Vitals:    03/03/25 1501   BP: 124/82   Pulse: 68   Resp: 16   SpO2: 98%       An electronic signature was used to authenticate this note.    --JAYDEN SANFORD, APRN - CNP

## 2025-03-06 ENCOUNTER — OFFICE VISIT (OUTPATIENT)
Dept: GYNECOLOGIC ONCOLOGY | Facility: CLINIC | Age: 60
End: 2025-03-06
Payer: COMMERCIAL

## 2025-03-06 VITALS
WEIGHT: 198.6 LBS | DIASTOLIC BLOOD PRESSURE: 78 MMHG | TEMPERATURE: 97.3 F | SYSTOLIC BLOOD PRESSURE: 118 MMHG | HEART RATE: 74 BPM | BODY MASS INDEX: 36.92 KG/M2 | OXYGEN SATURATION: 95 % | RESPIRATION RATE: 18 BRPM

## 2025-03-06 DIAGNOSIS — Z12.31 SCREENING MAMMOGRAM, ENCOUNTER FOR: ICD-10-CM

## 2025-03-06 DIAGNOSIS — C53.9 MALIGNANT NEOPLASM OF CERVIX, UNSPECIFIED SITE (MULTI): Primary | ICD-10-CM

## 2025-03-06 PROCEDURE — 99214 OFFICE O/P EST MOD 30 MIN: CPT | Performed by: NURSE PRACTITIONER

## 2025-03-06 ASSESSMENT — PAIN SCALES - GENERAL: PAINLEVEL_OUTOF10: 0-NO PAIN

## 2025-03-06 NOTE — PROGRESS NOTES
Patient ID: Cassy Casanova is a 59 y.o. female.  Referring Physician: No referring provider defined for this encounter.  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  Initial treatment was with with conization of the cervix, and later underwent total  vaginal hysterectomy in 2008.     No evidence of recurrent dysplasia or cancer on subsequent examinations and cytology assessments.     Diagnosed with papillary thyroid cancer in February 2016.  1 regional lymph node involvement with cancer.      1/8/16- left thyroid with isthmusectomy     4/8/16- right completion thyroidectomy with ipsilateral central neck lymph node dissection and right inferior parathyroid autotransplant.     6/8/17 vaginal scrape - cytology negative     8/25/17 anal skin tag - benign squamous papilloma (acrochordon)      8/2019 - mother (Kristy Rob - Dr. 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 Initial treatment with conization of the cervix  and later underwent total vaginal hysterectomy in 2008. Patient with history of thyroid cancer with involvement to 1 regional lymph node. Patient underwent a partial thyroidectomy in February 2016. Patient with completion of total thyroidectomy in 4/2016. Patient shared she is followed at [facility] and remains cancer free. Has overall been doing well. Travels 3 hours for appointments, but would eventually like to move back to the [place] area. Reports regular bowel and bladder habits. Saw a local urologist and was recommended she start Azo. She reports improvement in OAB symptoms and has been avoiding triggers.     Objective    BSA: 1.98 meters squared  /78   Pulse  74   Temp 36.3 °C (97.3 °F)   Resp 18   Wt 90.1 kg (198 lb 9.6 oz)   SpO2 95%   BMI 36.92 kg/m²      Physical Exam  Vitals and nursing note reviewed.   Constitutional:       Appearance: Normal appearance. She is normal weight.   HENT:      Mouth/Throat:      Mouth: Mucous membranes are moist.      Pharynx: Oropharynx is clear.   Eyes:      Conjunctiva/sclera: Conjunctivae normal.      Pupils: Pupils are equal, round, and reactive to light.   Cardiovascular:      Rate and Rhythm: Normal rate and regular rhythm.   Pulmonary:      Effort: Pulmonary effort is normal.      Breath sounds: Normal breath sounds.   Abdominal:      General: Abdomen is flat. There is no distension.      Palpations: Abdomen is soft. There is no mass.      Tenderness: There is no abdominal tenderness.   Genitourinary:     General: Normal vulva.      Vagina: Normal.      Uterus: Absent.       Rectum: Normal.   Musculoskeletal:         General: Normal range of motion.   Skin:     General: Skin is warm and dry.   Neurological:      Mental Status: She is alert.   Psychiatric:         Mood and Affect: Mood normal.         Behavior: Behavior normal.       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 Initial treatment with conization of the cervix  and later underwent total vaginal hysterectomy in 2008.     Plan:    Patient instructed to schedule to return to clinic in 12 months.      Check vaginal scrape with reflex HPV.    Order for mammogram entered electronically

## 2025-03-19 ENCOUNTER — TELEPHONE (OUTPATIENT)
Dept: GYNECOLOGIC ONCOLOGY | Facility: HOSPITAL | Age: 60
End: 2025-03-19
Payer: COMMERCIAL

## 2025-03-19 LAB
CYTOLOGY CMNT CVX/VAG CYTO-IMP: NORMAL
LAB AP HPV GENOTYPE QUESTION: YES
LAB AP HPV HR: NORMAL
LABORATORY COMMENT REPORT: NORMAL
PATH REPORT.TOTAL CANCER: NORMAL

## 2025-03-19 NOTE — TELEPHONE ENCOUNTER
Phoned patient to notify that pap results are negative and Amisha Piper CNP recommends keeping appointment as scheduled March 2026.  Patient verbalized her understanding of information given.

## 2025-06-16 ENCOUNTER — OFFICE VISIT (OUTPATIENT)
Dept: FAMILY MEDICINE CLINIC | Age: 60
End: 2025-06-16
Payer: COMMERCIAL

## 2025-06-16 ENCOUNTER — HOSPITAL ENCOUNTER (OUTPATIENT)
Dept: INTERVENTIONAL RADIOLOGY/VASCULAR | Age: 60
Discharge: HOME | End: 2025-06-18
Payer: COMMERCIAL

## 2025-06-16 ENCOUNTER — TELEPHONE (OUTPATIENT)
Dept: FAMILY MEDICINE CLINIC | Age: 60
End: 2025-06-16

## 2025-06-16 ENCOUNTER — RESULTS FOLLOW-UP (OUTPATIENT)
Dept: FAMILY MEDICINE CLINIC | Age: 60
End: 2025-06-16

## 2025-06-16 VITALS
SYSTOLIC BLOOD PRESSURE: 132 MMHG | RESPIRATION RATE: 20 BRPM | WEIGHT: 203 LBS | HEART RATE: 78 BPM | BODY MASS INDEX: 37.13 KG/M2 | DIASTOLIC BLOOD PRESSURE: 80 MMHG

## 2025-06-16 DIAGNOSIS — M79.604 RIGHT LEG PAIN: ICD-10-CM

## 2025-06-16 DIAGNOSIS — R60.0 LOWER EXTREMITY EDEMA: Primary | ICD-10-CM

## 2025-06-16 DIAGNOSIS — R60.0 LOWER EXTREMITY EDEMA: ICD-10-CM

## 2025-06-16 PROCEDURE — G8417 CALC BMI ABV UP PARAM F/U: HCPCS | Performed by: NURSE PRACTITIONER

## 2025-06-16 PROCEDURE — G8427 DOCREV CUR MEDS BY ELIG CLIN: HCPCS | Performed by: NURSE PRACTITIONER

## 2025-06-16 PROCEDURE — 99214 OFFICE O/P EST MOD 30 MIN: CPT | Performed by: NURSE PRACTITIONER

## 2025-06-16 PROCEDURE — 1036F TOBACCO NON-USER: CPT | Performed by: NURSE PRACTITIONER

## 2025-06-16 PROCEDURE — 93971 EXTREMITY STUDY: CPT

## 2025-06-16 PROCEDURE — 3017F COLORECTAL CA SCREEN DOC REV: CPT | Performed by: NURSE PRACTITIONER

## 2025-06-16 RX ORDER — PREDNISONE 20 MG/1
TABLET ORAL
Qty: 15 TABLET | Refills: 0 | Status: SHIPPED | OUTPATIENT
Start: 2025-06-16 | End: 2025-06-26

## 2025-06-16 ASSESSMENT — ENCOUNTER SYMPTOMS
COLOR CHANGE: 0
SHORTNESS OF BREATH: 0

## 2025-06-16 NOTE — PROGRESS NOTES
Janene Corral (:  1965) is a 59 y.o. female,Established patient, here for evaluation of the following chief complaint(s):  Leg Pain        Assessment & Plan   1. Lower extremity edema  -     Vascular duplex lower extremity venous right; Future  2. Right leg pain  -     Vascular duplex lower extremity venous right; Future    - New  - STAT doppler for evaluation of a DVT  - If positive, will treat accordingly  - If negative, pain likely d/t chronic changes of the lumbar spine. Will attempt antiinflammatory treatment with nsaids and prednisone  - OTC treatments as needed for symptomatic relief    Return if symptoms worsen or fail to improve.       Subjective     Patient presents for evaluation of right leg pain. Symptoms started 1 month ago. No known injury. Later developed bilateral leg swelling while on vacation, is unsure if this is related. Swelling has since improved but remains. Leg pain is of the lateral portion of the calf and up just beyond the knee. Tender to the touch. Also hurts with movements of the lower leg. Does not hurt if entire leg remains straight. No redness. No wounds.   Most recent xray of the lumbar spine revealed degenerative changes. Patient is questioning if this is the source of pain.     Leg Pain   The incident occurred more than 1 week ago (1 month or more). There was no injury mechanism. The pain is present in the right thigh and right leg. The quality of the pain is described as aching and burning. The pain has been Constant since onset. Associated symptoms include tingling. She reports no foreign bodies present. The symptoms are aggravated by movement and weight bearing. She has tried elevation, rest and acetaminophen for the symptoms.       Review of Systems   Respiratory:  Negative for shortness of breath.    Cardiovascular:  Positive for leg swelling.   Musculoskeletal:  Positive for myalgias. Negative for joint swelling.   Skin:  Negative for color change and

## 2025-08-18 DIAGNOSIS — N39.0 LOWER URINARY TRACT INFECTIOUS DISEASE: ICD-10-CM

## 2025-08-19 ENCOUNTER — TELEPHONE (OUTPATIENT)
Dept: FAMILY MEDICINE CLINIC | Age: 60
End: 2025-08-19

## 2025-08-20 ENCOUNTER — OFFICE VISIT (OUTPATIENT)
Dept: FAMILY MEDICINE CLINIC | Age: 60
End: 2025-08-20
Payer: COMMERCIAL

## 2025-08-20 ENCOUNTER — HOSPITAL ENCOUNTER (OUTPATIENT)
Dept: GENERAL RADIOLOGY | Age: 60
Discharge: HOME | End: 2025-08-20
Payer: COMMERCIAL

## 2025-08-20 ENCOUNTER — HOSPITAL ENCOUNTER (OUTPATIENT)
Age: 60
Discharge: HOME | End: 2025-08-20
Payer: COMMERCIAL

## 2025-08-20 VITALS
HEIGHT: 62 IN | TEMPERATURE: 97.8 F | WEIGHT: 200 LBS | SYSTOLIC BLOOD PRESSURE: 126 MMHG | BODY MASS INDEX: 36.8 KG/M2 | DIASTOLIC BLOOD PRESSURE: 78 MMHG | OXYGEN SATURATION: 95 % | HEART RATE: 65 BPM | RESPIRATION RATE: 18 BRPM

## 2025-08-20 DIAGNOSIS — M25.561 ACUTE PAIN OF RIGHT KNEE: ICD-10-CM

## 2025-08-20 DIAGNOSIS — M25.561 ACUTE PAIN OF RIGHT KNEE: Primary | ICD-10-CM

## 2025-08-20 PROCEDURE — 73564 X-RAY EXAM KNEE 4 OR MORE: CPT

## 2025-08-20 PROCEDURE — 3017F COLORECTAL CA SCREEN DOC REV: CPT | Performed by: NURSE PRACTITIONER

## 2025-08-20 PROCEDURE — 1036F TOBACCO NON-USER: CPT | Performed by: NURSE PRACTITIONER

## 2025-08-20 PROCEDURE — 99213 OFFICE O/P EST LOW 20 MIN: CPT | Performed by: NURSE PRACTITIONER

## 2025-08-20 PROCEDURE — G8427 DOCREV CUR MEDS BY ELIG CLIN: HCPCS | Performed by: NURSE PRACTITIONER

## 2025-08-20 PROCEDURE — G8417 CALC BMI ABV UP PARAM F/U: HCPCS | Performed by: NURSE PRACTITIONER

## 2025-08-20 RX ORDER — NICOTINE POLACRILEX 2 MG
GUM BUCCAL DAILY
COMMUNITY

## 2025-08-20 RX ORDER — VIT C/B6/B5/MAGNESIUM/HERB 173 50-5-6-5MG
500 CAPSULE ORAL DAILY
COMMUNITY

## 2025-08-20 RX ORDER — MULTIVIT-MIN/IRON/FOLIC ACID/K 18-600-40
CAPSULE ORAL DAILY
COMMUNITY

## 2025-08-20 RX ORDER — CHROMIUM 200 MCG
TABLET ORAL DAILY
COMMUNITY

## 2025-08-20 RX ORDER — MULTIVIT-MIN/IRON/FOLIC ACID/K 18-600-40
2000 CAPSULE ORAL DAILY
COMMUNITY

## 2025-08-20 RX ORDER — BACILLUS COAGULANS/INULIN 1B-250 MG
CAPSULE ORAL DAILY
COMMUNITY

## 2025-08-22 ENCOUNTER — RESULTS FOLLOW-UP (OUTPATIENT)
Dept: FAMILY MEDICINE CLINIC | Age: 60
End: 2025-08-22

## 2025-08-25 ENCOUNTER — TELEPHONE (OUTPATIENT)
Dept: FAMILY MEDICINE CLINIC | Age: 60
End: 2025-08-25